# Patient Record
Sex: FEMALE | URBAN - METROPOLITAN AREA
[De-identification: names, ages, dates, MRNs, and addresses within clinical notes are randomized per-mention and may not be internally consistent; named-entity substitution may affect disease eponyms.]

---

## 2020-11-13 ENCOUNTER — TELEPHONE (OUTPATIENT)
Dept: ONCOLOGY | Facility: CLINIC | Age: 85
End: 2020-11-13

## 2020-11-13 NOTE — TELEPHONE ENCOUNTER
----- Message from Shady Love MD sent at 11/13/2020  2:16 PM CST -----  I wrote  for an US of RUQ  to rule out obstruction and see if a drain might help to get bilirubin down   I called patient but got no answer   Can you give to schedulers to schedule ?   Her CT says the liver is not much different from before but she has developed ascites .  I suspect her liver is worse though   Thanks